# Patient Record
Sex: MALE | Race: WHITE
[De-identification: names, ages, dates, MRNs, and addresses within clinical notes are randomized per-mention and may not be internally consistent; named-entity substitution may affect disease eponyms.]

---

## 2018-03-05 ENCOUNTER — HOSPITAL ENCOUNTER (OUTPATIENT)
Dept: HOSPITAL 76 - LAB.WCP | Age: 68
Discharge: HOME | End: 2018-03-05
Attending: FAMILY MEDICINE
Payer: MEDICARE

## 2018-03-05 DIAGNOSIS — F32.9: ICD-10-CM

## 2018-03-05 DIAGNOSIS — R03.0: Primary | ICD-10-CM

## 2018-03-05 DIAGNOSIS — E78.5: ICD-10-CM

## 2018-03-05 DIAGNOSIS — R73.9: ICD-10-CM

## 2018-03-05 DIAGNOSIS — E03.9: ICD-10-CM

## 2018-03-05 DIAGNOSIS — Z12.5: ICD-10-CM

## 2018-03-05 LAB
ALBUMIN DIAFP-MCNC: 4.1 G/DL (ref 3.2–5.5)
ALBUMIN/GLOB SERPL: 1.5 {RATIO} (ref 1–2.2)
ALP SERPL-CCNC: 61 IU/L (ref 42–121)
ALT SERPL W P-5'-P-CCNC: 15 IU/L (ref 10–60)
ANION GAP SERPL CALCULATED.4IONS-SCNC: 5 MMOL/L (ref 6–13)
AST SERPL W P-5'-P-CCNC: 21 IU/L (ref 10–42)
BASOPHILS NFR BLD AUTO: 0 10^3/UL (ref 0–0.1)
BASOPHILS NFR BLD AUTO: 0.4 %
BILIRUB BLD-MCNC: 1 MG/DL (ref 0.2–1)
BUN SERPL-MCNC: 14 MG/DL (ref 6–20)
CALCIUM UR-MCNC: 8.6 MG/DL (ref 8.5–10.3)
CHLORIDE SERPL-SCNC: 104 MMOL/L (ref 101–111)
CHOLEST SERPL-MCNC: 202 MG/DL
CO2 SERPL-SCNC: 28 MMOL/L (ref 21–32)
CREAT SERPLBLD-SCNC: 1.1 MG/DL (ref 0.6–1.2)
EOSINOPHIL # BLD AUTO: 0.1 10^3/UL (ref 0–0.7)
EOSINOPHIL NFR BLD AUTO: 1.8 %
ERYTHROCYTE [DISTWIDTH] IN BLOOD BY AUTOMATED COUNT: 14.1 % (ref 12–15)
GFRSERPLBLD MDRD-ARVRAT: 67 ML/MIN/{1.73_M2} (ref 89–?)
GLOBULIN SER-MCNC: 2.8 G/DL (ref 2.1–4.2)
GLUCOSE SERPL-MCNC: 103 MG/DL (ref 70–100)
HDLC SERPL-MCNC: 56 MG/DL
HDLC SERPL: 3.6 {RATIO} (ref ?–5)
HGB UR QL STRIP: 15.1 G/DL (ref 14–18)
LDLC SERPL CALC-MCNC: 134 MG/DL
LDLC/HDLC SERPL: 2.4 {RATIO} (ref ?–3.6)
LYMPHOCYTES # SPEC AUTO: 2.3 10^3/UL (ref 1.5–3.5)
LYMPHOCYTES NFR BLD AUTO: 39 %
MCH RBC QN AUTO: 29.1 PG (ref 27–31)
MCHC RBC AUTO-ENTMCNC: 33.4 G/DL (ref 32–36)
MCV RBC AUTO: 87.4 FL (ref 80–94)
MONOCYTES # BLD AUTO: 0.7 10^3/UL (ref 0–1)
MONOCYTES NFR BLD AUTO: 12.6 %
NEUTROPHILS # BLD AUTO: 2.7 10^3/UL (ref 1.5–6.6)
NEUTROPHILS # SNV AUTO: 6 X10^3/UL (ref 4.8–10.8)
NEUTROPHILS NFR BLD AUTO: 46.2 %
PDW BLD AUTO: 8.1 FL (ref 7.4–11.4)
PLAT MORPH BLD: (no result)
PLATELET # BLD: 247 10^3/UL (ref 130–450)
PLATELET BLD QL SMEAR: (no result)
PROT SPEC-MCNC: 6.9 G/DL (ref 6.7–8.2)
RBC MAR: 5.2 10^6/UL (ref 4.7–6.1)
RBC MORPH BLD: (no result)
SODIUM SERPLBLD-SCNC: 137 MMOL/L (ref 135–145)
VLDLC SERPL-SCNC: 12 MG/DL

## 2018-03-05 PROCEDURE — 83721 ASSAY OF BLOOD LIPOPROTEIN: CPT

## 2018-03-05 PROCEDURE — 80061 LIPID PANEL: CPT

## 2018-03-05 PROCEDURE — 36415 COLL VENOUS BLD VENIPUNCTURE: CPT

## 2018-03-05 PROCEDURE — 80053 COMPREHEN METABOLIC PANEL: CPT

## 2018-03-05 PROCEDURE — 84153 ASSAY OF PSA TOTAL: CPT

## 2018-03-05 PROCEDURE — 85025 COMPLETE CBC W/AUTO DIFF WBC: CPT

## 2018-07-08 ENCOUNTER — HOSPITAL ENCOUNTER (EMERGENCY)
Dept: HOSPITAL 76 - ED | Age: 68
Discharge: HOME | End: 2018-07-08
Payer: MEDICARE

## 2018-07-08 VITALS — DIASTOLIC BLOOD PRESSURE: 81 MMHG | SYSTOLIC BLOOD PRESSURE: 133 MMHG

## 2018-07-08 DIAGNOSIS — M62.830: Primary | ICD-10-CM

## 2018-07-08 LAB
ALBUMIN DIAFP-MCNC: 4.4 G/DL (ref 3.2–5.5)
ALBUMIN/GLOB SERPL: 1.5 {RATIO} (ref 1–2.2)
ALP SERPL-CCNC: 63 IU/L (ref 42–121)
ALT SERPL W P-5'-P-CCNC: 19 IU/L (ref 10–60)
ANION GAP SERPL CALCULATED.4IONS-SCNC: 9 MMOL/L (ref 6–13)
AST SERPL W P-5'-P-CCNC: 34 IU/L (ref 10–42)
BASOPHILS NFR BLD AUTO: 0 10^3/UL (ref 0–0.1)
BASOPHILS NFR BLD AUTO: 0.3 %
BILIRUB BLD-MCNC: 1 MG/DL (ref 0.2–1)
BUN SERPL-MCNC: 13 MG/DL (ref 6–20)
CALCIUM UR-MCNC: 9 MG/DL (ref 8.5–10.3)
CHLORIDE SERPL-SCNC: 105 MMOL/L (ref 101–111)
CLARITY UR REFRACT.AUTO: CLEAR
CO2 SERPL-SCNC: 24 MMOL/L (ref 21–32)
CREAT SERPLBLD-SCNC: 1.2 MG/DL (ref 0.6–1.2)
EOSINOPHIL # BLD AUTO: 0 10^3/UL (ref 0–0.7)
EOSINOPHIL NFR BLD AUTO: 0 %
ERYTHROCYTE [DISTWIDTH] IN BLOOD BY AUTOMATED COUNT: 14 % (ref 12–15)
GFRSERPLBLD MDRD-ARVRAT: 60 ML/MIN/{1.73_M2} (ref 89–?)
GLOBULIN SER-MCNC: 2.9 G/DL (ref 2.1–4.2)
GLUCOSE SERPL-MCNC: 130 MG/DL (ref 70–100)
GLUCOSE UR QL STRIP.AUTO: NEGATIVE MG/DL
HGB UR QL STRIP: 14.6 G/DL (ref 14–18)
KETONES UR QL STRIP.AUTO: NEGATIVE MG/DL
LIPASE SERPL-CCNC: 21 U/L (ref 22–51)
LYMPHOCYTES # SPEC AUTO: 0.6 10^3/UL (ref 1.5–3.5)
LYMPHOCYTES NFR BLD AUTO: 8.6 %
MCH RBC QN AUTO: 29.4 PG (ref 27–31)
MCHC RBC AUTO-ENTMCNC: 32.6 G/DL (ref 32–36)
MCV RBC AUTO: 90.2 FL (ref 80–94)
MONOCYTES # BLD AUTO: 0.2 10^3/UL (ref 0–1)
MONOCYTES NFR BLD AUTO: 2.5 %
NEUTROPHILS # BLD AUTO: 6.2 10^3/UL (ref 1.5–6.6)
NEUTROPHILS # SNV AUTO: 7 X10^3/UL (ref 4.8–10.8)
NEUTROPHILS NFR BLD AUTO: 88.6 %
NITRITE UR QL STRIP.AUTO: NEGATIVE
PDW BLD AUTO: 8.3 FL (ref 7.4–11.4)
PH UR STRIP.AUTO: 6 PH (ref 5–7.5)
PLATELET # BLD: 261 10^3/UL (ref 130–450)
PROT SPEC-MCNC: 7.3 G/DL (ref 6.7–8.2)
PROT UR STRIP.AUTO-MCNC: NEGATIVE MG/DL
RBC # UR STRIP.AUTO: NEGATIVE /UL
RBC MAR: 4.96 10^6/UL (ref 4.7–6.1)
SODIUM SERPLBLD-SCNC: 138 MMOL/L (ref 135–145)
SP GR UR STRIP.AUTO: <=1.005 (ref 1–1.03)
UROBILINOGEN UR QL STRIP.AUTO: (no result) E.U./DL
UROBILINOGEN UR STRIP.AUTO-MCNC: NEGATIVE MG/DL

## 2018-07-08 PROCEDURE — 80053 COMPREHEN METABOLIC PANEL: CPT

## 2018-07-08 PROCEDURE — 81003 URINALYSIS AUTO W/O SCOPE: CPT

## 2018-07-08 PROCEDURE — 99283 EMERGENCY DEPT VISIT LOW MDM: CPT

## 2018-07-08 PROCEDURE — 83690 ASSAY OF LIPASE: CPT

## 2018-07-08 PROCEDURE — 36415 COLL VENOUS BLD VENIPUNCTURE: CPT

## 2018-07-08 PROCEDURE — 87086 URINE CULTURE/COLONY COUNT: CPT

## 2018-07-08 PROCEDURE — 96372 THER/PROPH/DIAG INJ SC/IM: CPT

## 2018-07-08 PROCEDURE — 85025 COMPLETE CBC W/AUTO DIFF WBC: CPT

## 2018-07-08 PROCEDURE — 81001 URINALYSIS AUTO W/SCOPE: CPT
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
## 2019-04-25 ENCOUNTER — HOSPITAL ENCOUNTER (OUTPATIENT)
Dept: HOSPITAL 76 - LAB.WCP | Age: 69
Discharge: HOME | End: 2019-04-25
Attending: FAMILY MEDICINE
Payer: MEDICARE

## 2019-04-25 DIAGNOSIS — E03.9: ICD-10-CM

## 2019-04-25 DIAGNOSIS — Z12.5: ICD-10-CM

## 2019-04-25 DIAGNOSIS — E78.5: Primary | ICD-10-CM

## 2019-04-25 DIAGNOSIS — Z79.899: ICD-10-CM

## 2019-04-25 LAB
ALBUMIN DIAFP-MCNC: 4 G/DL (ref 3.2–5.5)
ALBUMIN/GLOB SERPL: 1.3 {RATIO} (ref 1–2.2)
ALP SERPL-CCNC: 82 IU/L (ref 42–121)
ALT SERPL W P-5'-P-CCNC: 15 IU/L (ref 10–60)
ANION GAP SERPL CALCULATED.4IONS-SCNC: 7 MMOL/L (ref 6–13)
AST SERPL W P-5'-P-CCNC: 22 IU/L (ref 10–42)
BASOPHILS NFR BLD AUTO: 0 10^3/UL (ref 0–0.1)
BASOPHILS NFR BLD AUTO: 0.2 %
BILIRUB BLD-MCNC: 0.6 MG/DL (ref 0.2–1)
BUN SERPL-MCNC: 13 MG/DL (ref 6–20)
CALCIUM UR-MCNC: 8.9 MG/DL (ref 8.5–10.3)
CHLORIDE SERPL-SCNC: 101 MMOL/L (ref 101–111)
CHOLEST SERPL-MCNC: 206 MG/DL
CO2 SERPL-SCNC: 28 MMOL/L (ref 21–32)
CREAT SERPLBLD-SCNC: 1.1 MG/DL (ref 0.6–1.2)
EOSINOPHIL # BLD AUTO: 0.4 10^3/UL (ref 0–0.7)
EOSINOPHIL NFR BLD AUTO: 6.6 %
ERYTHROCYTE [DISTWIDTH] IN BLOOD BY AUTOMATED COUNT: 14.5 % (ref 12–15)
GFRSERPLBLD MDRD-ARVRAT: 67 ML/MIN/{1.73_M2} (ref 89–?)
GLOBULIN SER-MCNC: 3 G/DL (ref 2.1–4.2)
GLUCOSE SERPL-MCNC: 95 MG/DL (ref 70–100)
HDLC SERPL-MCNC: 49 MG/DL
HDLC SERPL: 4.2 {RATIO} (ref ?–5)
HGB UR QL STRIP: 14.9 G/DL (ref 14–18)
LDLC SERPL CALC-MCNC: 138 MG/DL
LDLC/HDLC SERPL: 2.8 {RATIO} (ref ?–3.6)
LYMPHOCYTES # SPEC AUTO: 2.3 10^3/UL (ref 1.5–3.5)
LYMPHOCYTES NFR BLD AUTO: 38.7 %
MCH RBC QN AUTO: 29.4 PG (ref 27–31)
MCHC RBC AUTO-ENTMCNC: 33.1 G/DL (ref 32–36)
MCV RBC AUTO: 88.7 FL (ref 80–94)
MONOCYTES # BLD AUTO: 1 10^3/UL (ref 0–1)
MONOCYTES NFR BLD AUTO: 17.2 %
NEUTROPHILS # BLD AUTO: 2.2 10^3/UL (ref 1.5–6.6)
NEUTROPHILS # SNV AUTO: 6 X10^3/UL (ref 4.8–10.8)
NEUTROPHILS NFR BLD AUTO: 37.3 %
PDW BLD AUTO: 8.8 FL (ref 7.4–11.4)
PLATELET # BLD: 221 10^3/UL (ref 130–450)
PROT SPEC-MCNC: 7 G/DL (ref 6.7–8.2)
PSA SCREEN (Z12.5): 0.33 NG/ML (ref 0–2)
RBC MAR: 5.07 10^6/UL (ref 4.7–6.1)
SODIUM SERPLBLD-SCNC: 136 MMOL/L (ref 135–145)
T3FREE SERPL-MCNC: 3.44 PG/ML (ref 2.5–3.9)
T4 FREE SERPL-MCNC: 1.37 NG/DL (ref 0.58–1.64)
VLDLC SERPL-SCNC: 19 MG/DL

## 2019-04-25 PROCEDURE — 84439 ASSAY OF FREE THYROXINE: CPT

## 2019-04-25 PROCEDURE — 84443 ASSAY THYROID STIM HORMONE: CPT

## 2019-04-25 PROCEDURE — 36415 COLL VENOUS BLD VENIPUNCTURE: CPT

## 2019-04-25 PROCEDURE — 84153 ASSAY OF PSA TOTAL: CPT

## 2019-04-25 PROCEDURE — 80053 COMPREHEN METABOLIC PANEL: CPT

## 2019-04-25 PROCEDURE — 80061 LIPID PANEL: CPT

## 2019-04-25 PROCEDURE — 84481 FREE ASSAY (FT-3): CPT

## 2019-04-25 PROCEDURE — 83721 ASSAY OF BLOOD LIPOPROTEIN: CPT

## 2019-04-25 PROCEDURE — 85025 COMPLETE CBC W/AUTO DIFF WBC: CPT

## 2019-06-04 ENCOUNTER — HOSPITAL ENCOUNTER (OUTPATIENT)
Dept: HOSPITAL 76 - SDS | Age: 69
Discharge: HOME | End: 2019-06-04
Attending: SURGERY
Payer: MEDICARE

## 2019-06-04 VITALS — DIASTOLIC BLOOD PRESSURE: 72 MMHG | SYSTOLIC BLOOD PRESSURE: 105 MMHG

## 2019-06-04 DIAGNOSIS — K21.9: ICD-10-CM

## 2019-06-04 DIAGNOSIS — Z12.11: Primary | ICD-10-CM

## 2019-06-04 PROCEDURE — 0DJD8ZZ INSPECTION OF LOWER INTESTINAL TRACT, VIA NATURAL OR ARTIFICIAL OPENING ENDOSCOPIC: ICD-10-PCS | Performed by: SURGERY

## 2020-02-24 ENCOUNTER — HOSPITAL ENCOUNTER (OUTPATIENT)
Dept: HOSPITAL 76 - DI | Age: 70
Discharge: HOME | End: 2020-02-24
Attending: FAMILY MEDICINE
Payer: MEDICARE

## 2020-02-24 DIAGNOSIS — R05: Primary | ICD-10-CM

## 2020-02-24 PROCEDURE — 71046 X-RAY EXAM CHEST 2 VIEWS: CPT

## 2020-02-25 NOTE — XRAY REPORT
Reason:  COUGH

Procedure Date:  02/24/2020   

Accession Number:  [id number] / [id number]                    

Procedure:  XR  - Chest 2 View X-Ray CPT Code:  02167

 

***Final Report***

 

 

FULL RESULT:

 

 

EXAM:

CHEST RADIOGRAPHY

 

EXAM DATE: 2/24/2020 11:37 AM.

 

CLINICAL HISTORY: Dry cough. Ill x4 weeks.

 

COMPARISON: 12/20/2015.

 

TECHNIQUE: 2 views.

 

FINDINGS:

Lungs/Pleura: Apical lordotic positioning. No focal opacities or vascular 

congestion. No pleural effusion or pneumothorax. Normal volumes.

 

Mediastinum: Heart and mediastinal contours are unremarkable.

 

Bones: No acute osseous findings identified.

IMPRESSION:

1. No acute cardiopulmonary findings or significant change.

 

[facility]

## 2020-02-26 ENCOUNTER — HOSPITAL ENCOUNTER (EMERGENCY)
Dept: HOSPITAL 76 - ED | Age: 70
Discharge: HOME | End: 2020-02-26
Payer: MEDICARE

## 2020-02-26 VITALS — DIASTOLIC BLOOD PRESSURE: 72 MMHG | SYSTOLIC BLOOD PRESSURE: 128 MMHG

## 2020-02-26 DIAGNOSIS — R05: Primary | ICD-10-CM

## 2020-02-26 DIAGNOSIS — R91.8: ICD-10-CM

## 2020-02-26 PROCEDURE — 99284 EMERGENCY DEPT VISIT MOD MDM: CPT

## 2020-02-26 PROCEDURE — 99283 EMERGENCY DEPT VISIT LOW MDM: CPT

## 2020-02-26 PROCEDURE — 71046 X-RAY EXAM CHEST 2 VIEWS: CPT

## 2020-02-26 NOTE — XRAY REPORT
Reason:  Cough

Procedure Date:  02/26/2020   

Accession Number:  [id number] / [id number]                    

Procedure:  XR  - Chest 2 View X-Ray CPT Code:  38321

 

***Final Report***

 

 

FULL RESULT:

 

 

EXAM:

CHEST RADIOGRAPHY

 

EXAM DATE: 2/26/2020 08:23 AM.

 

CLINICAL HISTORY: Cough.

 

COMPARISON: CHEST 2 VIEW 02/24/2020 11:32 AM.

 

TECHNIQUE: 2 views.

 

FINDINGS:

Lungs/Pleura: Small area of increased density overlying the sixth 

anterior right rib could be a small infiltrate. No other focal opacities 

evident. No pleural effusion. No pneumothorax. Normal volumes.

 

Mediastinum: Heart and mediastinal contours are unremarkable.

 

Other: Degenerative change in the spine with lumbar dextroscoliosis..

IMPRESSION: Possible small area of infiltrate inferior right lung. No 

other potentially acute findings seen.

 

[facility]

## 2020-02-26 NOTE — ED PHYSICIAN DOCUMENTATION
PD HPI URI





- Stated complaint


Stated Complaint: COUGH





- Chief complaint


Chief Complaint: Resp





- History obtained from


History obtained from: Patient





- History of Present Illness


Timing - onset: How many weeks ago (4)


Timing duration: Weeks (4)


Timing details: Gradual onset, Still present


Associated symptoms: Productive cough (now somewhat productive with clear/white 

sputum).  No: Fever


Contributing factors: No: Sick contact, Travel, COPD / asthma





Review of Systems


Constitutional: denies: Fever, Chills


Nose: denies: Rhinorrhea / runny nose, Congestion


Throat: denies: Sore throat


Cardiac: denies: Chest pain / pressure


Respiratory: reports: Dyspnea, Cough.  denies: Wheezing


GI: denies: Nausea, Vomiting, Diarrhea


Musculoskeletal: denies: Extremity swelling


Neurologic: denies: Generalized weakness





PD PAST MEDICAL HISTORY





- Past Medical History


Past Medical History: Yes


Cardiovascular: None


Respiratory: None


Endocrine/Autoimmune: HyPOthyroidism


GI: None


: None


HEENT: Other


Psych: Depression


Musculoskeletal: Osteoarthritis, Chronic back pain


Derm: None





- Past Surgical History


Past Surgical History: Yes


General: Appendectomy


Ortho: ACL reconstruction





- Present Medications


Home Medications: 


                                Ambulatory Orders











 Medication  Instructions  Recorded  Confirmed


 


Levothyroxine Sodium [Synthroid] 175 mcg PO DAILY 10/08/13 06/04/19


 


Bupropion HCl [Wellbutrin Xl] 300 mg PO DAILY 12/20/15 06/04/19


 


Dutasteride [Avodart] 0.5 mg PO DAILY 12/20/15 06/04/19


 


Aspirin 81 mg PO DAILY 06/03/19 06/03/19


 


DULoxetine [Cymbalta] 20 mg PO DAILY 06/03/19 06/03/19


 


Doxycycline Monohydrate 100 mg PO BID #14 tablet 02/26/20 


 


dexAMETHasone [Decadron] 4 mg PO DAILY #5 tablet 02/26/20 


 


guaiFENesin/CODEINE [Robitussin AC] 10 ml PO Q6H PRN #240 ml 02/26/20 














- Allergies


Allergies/Adverse Reactions: 


                                    Allergies











Allergy/AdvReac Type Severity Reaction Status Date / Time


 


No Known Drug Allergies Allergy   Verified 02/26/20 08:16














- Social History


Does the pt smoke?: No


Smoking Status: Never smoker


Does the pt drink ETOH?: Yes


Does the pt have substance abuse?: No





- Immunizations


Immunizations are current?: No


Immunizations: TDAP >10years/unknown





- POLST


Patient has POLST: No





PD ED PE NORMAL





- Vitals


Vital signs reviewed: Yes





- General


General: Alert and oriented X 3, No acute distress, Well developed/nourished





- HEENT


HEENT: Pharynx benign





- Neck


Neck: Supple, no meningeal sign, No adenopathy





- Cardiac


Cardiac: RRR, No murmur





- Respiratory


Respiratory: Clear bilaterally





- Derm


Derm: Normal color, Warm and dry





- Extremities


Extremities: No edema, No calf tenderness / cord





- Neuro


Neuro: Alert and oriented X 3, No motor deficit, Normal speech





Results





- Vitals


Vitals: 


                               Vital Signs - 24 hr











  02/26/20





  09:36


 


Temperature 36.5 C


 


Heart Rate 68


 


Respiratory 15





Rate 


 


Blood Pressure 128/72


 


O2 Saturation 99








                                     Oxygen











O2 Source                      Room air

















- Rads (name of study)


  ** chest xray


Radiology: Prelim report reviewed (possible small infiltrate right lower), See 

rad report





PD MEDICAL DECISION MAKING





- ED course


Complexity details: considered differential, d/w patient





Departure





- Departure


Disposition: 01 Home, Self Care


Clinical Impression: 


 Persistent cough, Infiltrate of lung present on chest x-ray





Condition: Stable


Record reviewed to determine appropriate education?: Yes


Follow-Up: 


Ike Sanderson DO [Primary Care Provider] - 


Prescriptions: 


dexAMETHasone [Decadron] 4 mg PO DAILY #5 tablet


Doxycycline Monohydrate 100 mg PO BID #14 tablet


guaiFENesin/CODEINE [Robitussin AC] 10 ml PO Q6H PRN #240 ml


 PRN Reason: Cough


Comments: 


You can continue the benzonatate (Tessalon) 3-4 times a day as needed for cough.

 2 would add Robitussin with codeine to help with cough suppression.





To reduce the bronchial inflammation and irritation, use the Decadron steroid 

daily for 5 more days.





Your chest x-ray showed a small patch of infiltrate in the right lower area 

which could represent a mild pneumonia so add doxycycline antibiotic as well.





Recheck if not improving well over the next several days to week.


Discharge Date/Time: 02/26/20 09:47

## 2020-09-09 ENCOUNTER — HOSPITAL ENCOUNTER (OUTPATIENT)
Dept: HOSPITAL 76 - DI | Age: 70
Discharge: HOME | End: 2020-09-09
Attending: PHYSICIAN ASSISTANT
Payer: MEDICARE

## 2020-09-09 DIAGNOSIS — M25.551: ICD-10-CM

## 2020-09-09 DIAGNOSIS — M79.671: Primary | ICD-10-CM

## 2020-09-10 NOTE — XRAY REPORT
PROCEDURE:  Foot 3 View RT

 

INDICATIONS:  RIGHT FOOT PAIN

 

TECHNIQUE:  3 views of the foot were acquired.  

 

COMPARISON:  None.

 

FINDINGS:  

 

Bones:  No fractures or dislocations.  No suspicious bony lesions.  

 

Soft tissues:  No tibiotalar joint effusion.  Achilles tendon appears normal.  

 

IMPRESSION:  

 

1. No fracture or dislocation.

 

Reviewed by: Amaury Dutton MD on 9/10/2020 12:39 PM PDT

Approved by: Amaury Dutton MD on 9/10/2020 12:39 PM PDT

 

 

Station ID:  [id number]

## 2020-09-10 NOTE — XRAY REPORT
PROCEDURE:  Hip w/Pelvis 1V RT

 

INDICATIONS:  Right hip pain

 

TECHNIQUE:  AP pelvis with lateral view of the right hip.

 

COMPARISON:  CT abdomen pelvis 6/12/2014.

 

FINDINGS:  

 

Bones:  No fractures or dislocations.  The hip joint spaces appear preserved. Pelvic ring appears int
act.  No suspicious bony lesions.  There is facet arthropathy and degenerative disc disease within th
e visualized lower lumbar spine.

 

Soft tissues:  The visualized bowel gas pattern is normal.  No suspicious soft tissue calcifications.
  

 

IMPRESSION:  

 

1. No acute bony abnormality.

 

Reviewed by: Amaury Dutton MD on 9/10/2020 12:25 PM PDT

Approved by: Amaury Dutton MD on 9/10/2020 12:25 PM PDT

 

 

Station ID:  [id number]

## 2020-09-30 ENCOUNTER — HOSPITAL ENCOUNTER (OUTPATIENT)
Dept: HOSPITAL 76 - LAB | Age: 70
Discharge: HOME | End: 2020-09-30
Attending: UROLOGY
Payer: MEDICARE

## 2020-09-30 DIAGNOSIS — Z12.5: Primary | ICD-10-CM

## 2020-09-30 PROCEDURE — 36415 COLL VENOUS BLD VENIPUNCTURE: CPT

## 2020-09-30 PROCEDURE — 84153 ASSAY OF PSA TOTAL: CPT

## 2020-11-30 ENCOUNTER — HOSPITAL ENCOUNTER (OUTPATIENT)
Dept: HOSPITAL 76 - LAB.R | Age: 70
Discharge: HOME | End: 2020-11-30
Attending: FAMILY MEDICINE
Payer: MEDICARE

## 2020-11-30 DIAGNOSIS — R05: Primary | ICD-10-CM

## 2020-11-30 DIAGNOSIS — Z20.828: ICD-10-CM

## 2021-02-08 ENCOUNTER — HOSPITAL ENCOUNTER (OUTPATIENT)
Dept: HOSPITAL 76 - DI.N | Age: 71
Discharge: HOME | End: 2021-02-08
Attending: PHYSICIAN ASSISTANT
Payer: MEDICARE

## 2021-02-08 DIAGNOSIS — M19.041: ICD-10-CM

## 2021-02-08 DIAGNOSIS — M79.641: Primary | ICD-10-CM

## 2021-02-08 NOTE — XRAY REPORT
PROCEDURE:  Hand 3 View RT

 

INDICATIONS:  R HAND PX

 

TECHNIQUE:  3 views of the hand(s) acquired.  

 

COMPARISON:  None

 

FINDINGS:  

 

Bones:  No fractures or dislocations.  No suspicious bony lesions.  Mild scattered IP degenerative na
rrowing with moderate narrowing at the first CMC joint. There is mild cluster of calcifications noted
 along the lateral aspect of the first CMC joint.

 

Soft tissues:  No suspicious soft tissue calcifications.  

 

IMPRESSION:  

Degenerative changes most notable at the first CMC joint as above with adjacent calcifications. The l
atter could be related to old trauma.

 

Reviewed by: Elisa Garza MD on 2/8/2021 2:52 PM PST

Approved by: Elisa Garza MD on 2/8/2021 2:52 PM PST

 

 

Station ID:  [id number]

## 2021-06-09 ENCOUNTER — HOSPITAL ENCOUNTER (OUTPATIENT)
Dept: HOSPITAL 76 - LAB.WCP | Age: 71
Discharge: HOME | End: 2021-06-09
Attending: FAMILY MEDICINE
Payer: MEDICARE

## 2021-06-09 DIAGNOSIS — E78.5: Primary | ICD-10-CM

## 2021-06-09 DIAGNOSIS — N40.1: ICD-10-CM

## 2021-06-09 DIAGNOSIS — K21.9: ICD-10-CM

## 2021-06-09 DIAGNOSIS — E03.9: ICD-10-CM

## 2021-06-09 DIAGNOSIS — N13.8: ICD-10-CM

## 2021-06-09 LAB
ALBUMIN DIAFP-MCNC: 4.4 G/DL (ref 3.2–5.5)
ALBUMIN/GLOB SERPL: 1.8 {RATIO} (ref 1–2.2)
ALP SERPL-CCNC: 77 IU/L (ref 42–121)
ALT SERPL W P-5'-P-CCNC: 16 IU/L (ref 10–60)
ANION GAP SERPL CALCULATED.4IONS-SCNC: 10 MMOL/L (ref 6–13)
AST SERPL W P-5'-P-CCNC: 23 IU/L (ref 10–42)
BASOPHILS NFR BLD AUTO: 0 10^3/UL (ref 0–0.1)
BASOPHILS NFR BLD AUTO: 0.7 %
BILIRUB BLD-MCNC: 0.6 MG/DL (ref 0.2–1)
BUN SERPL-MCNC: 22 MG/DL (ref 6–20)
CALCIUM UR-MCNC: 9.4 MG/DL (ref 8.5–10.3)
CHLORIDE SERPL-SCNC: 103 MMOL/L (ref 101–111)
CHOLEST SERPL-MCNC: 223 MG/DL
CO2 SERPL-SCNC: 28 MMOL/L (ref 21–32)
CREAT SERPLBLD-SCNC: 1.2 MG/DL (ref 0.6–1.2)
EOSINOPHIL # BLD AUTO: 0.2 10^3/UL (ref 0–0.7)
EOSINOPHIL NFR BLD AUTO: 3.4 %
ERYTHROCYTE [DISTWIDTH] IN BLOOD BY AUTOMATED COUNT: 13.7 % (ref 12–15)
GFRSERPLBLD MDRD-ARVRAT: 60 ML/MIN/{1.73_M2} (ref 89–?)
GLOBULIN SER-MCNC: 2.5 G/DL (ref 2.1–4.2)
GLUCOSE SERPL-MCNC: 97 MG/DL (ref 70–100)
HCT VFR BLD AUTO: 47.2 % (ref 42–52)
HDLC SERPL-MCNC: 60 MG/DL
HDLC SERPL: 3.7 {RATIO} (ref ?–5)
HGB UR QL STRIP: 14.8 G/DL (ref 14–18)
LDLC SERPL CALC-MCNC: 147 MG/DL
LDLC/HDLC SERPL: 2.5 {RATIO} (ref ?–3.6)
LYMPHOCYTES # SPEC AUTO: 1.9 10^3/UL (ref 1.5–3.5)
LYMPHOCYTES NFR BLD AUTO: 36.1 %
MCH RBC QN AUTO: 28.8 PG (ref 27–31)
MCHC RBC AUTO-ENTMCNC: 31.4 G/DL (ref 32–36)
MCV RBC AUTO: 92 FL (ref 80–94)
MONOCYTES # BLD AUTO: 0.8 10^3/UL (ref 0–1)
MONOCYTES NFR BLD AUTO: 14 %
NEUTROPHILS # BLD AUTO: 2.4 10^3/UL (ref 1.5–6.6)
NEUTROPHILS # SNV AUTO: 5.3 X10^3/UL (ref 4.8–10.8)
NEUTROPHILS NFR BLD AUTO: 45.6 %
NRBC # BLD AUTO: 0 /100WBC
NRBC # BLD AUTO: 0 X10^3/UL
PDW BLD AUTO: 10.5 FL (ref 7.4–11.4)
PLATELET # BLD: 255 10^3/UL (ref 130–450)
POTASSIUM SERPL-SCNC: 4.6 MMOL/L (ref 3.5–5)
PROT SPEC-MCNC: 6.9 G/DL (ref 6.7–8.2)
RBC MAR: 5.13 10^6/UL (ref 4.7–6.1)
SODIUM SERPLBLD-SCNC: 141 MMOL/L (ref 135–145)
T4 FREE SERPL-MCNC: 1.38 NG/DL (ref 0.58–1.64)
TRIGL P FAST SERPL-MCNC: 80 MG/DL
TSH SERPL-ACNC: < 0.08 UIU/ML (ref 0.34–5.6)
VLDLC SERPL-SCNC: 16 MG/DL

## 2021-06-09 PROCEDURE — 84443 ASSAY THYROID STIM HORMONE: CPT

## 2021-06-09 PROCEDURE — 36415 COLL VENOUS BLD VENIPUNCTURE: CPT

## 2021-06-09 PROCEDURE — 80053 COMPREHEN METABOLIC PANEL: CPT

## 2021-06-09 PROCEDURE — 83721 ASSAY OF BLOOD LIPOPROTEIN: CPT

## 2021-06-09 PROCEDURE — 84153 ASSAY OF PSA TOTAL: CPT

## 2021-06-09 PROCEDURE — 85025 COMPLETE CBC W/AUTO DIFF WBC: CPT

## 2021-06-09 PROCEDURE — 80061 LIPID PANEL: CPT

## 2021-06-09 PROCEDURE — 84439 ASSAY OF FREE THYROXINE: CPT

## 2021-07-01 ENCOUNTER — HOSPITAL ENCOUNTER (OUTPATIENT)
Dept: HOSPITAL 76 - COV | Age: 71
Discharge: HOME | End: 2021-07-01
Attending: FAMILY MEDICINE
Payer: MEDICARE

## 2021-07-01 DIAGNOSIS — M79.10: Primary | ICD-10-CM

## 2021-07-01 DIAGNOSIS — R53.83: ICD-10-CM

## 2021-07-01 DIAGNOSIS — R07.0: ICD-10-CM

## 2021-07-01 DIAGNOSIS — Z20.822: ICD-10-CM

## 2021-07-13 ENCOUNTER — HOSPITAL ENCOUNTER (EMERGENCY)
Dept: HOSPITAL 76 - ED | Age: 71
Discharge: HOME | End: 2021-07-13
Payer: MEDICARE

## 2021-07-13 VITALS — SYSTOLIC BLOOD PRESSURE: 135 MMHG | DIASTOLIC BLOOD PRESSURE: 78 MMHG
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
## 2021-07-13 DIAGNOSIS — M17.12: ICD-10-CM

## 2021-07-13 DIAGNOSIS — S86.812A: Primary | ICD-10-CM

## 2021-07-13 DIAGNOSIS — Z79.82: ICD-10-CM

## 2021-07-13 DIAGNOSIS — M19.072: ICD-10-CM

## 2021-07-13 PROCEDURE — 99283 EMERGENCY DEPT VISIT LOW MDM: CPT

## 2021-07-13 PROCEDURE — 73590 X-RAY EXAM OF LOWER LEG: CPT

## 2021-07-13 RX ADMIN — HYDROCODONE BITARTRATE AND ACETAMINOPHEN STA TAB: 5; 325 TABLET ORAL at 10:51

## 2021-07-13 RX ADMIN — IBUPROFEN STA MG: 600 TABLET, FILM COATED ORAL at 10:51

## 2021-07-13 NOTE — ED PHYSICIAN DOCUMENTATION
PD HPI LOWER EXT INJURY





- Stated complaint


Stated Complaint: LT LEG INJ





- Chief complaint


Chief Complaint: Ext Problem





- History obtained from


History obtained from: Patient





- History of Present Illness


PD HPI LOW EXT INJURY LOCATION: Left, Calf


Type of injury: Twist (he fell and felt abrupt pain lateral mid calf muscles. 

Pain with walking. No foot drop.)


Worsened by: Moving, Palpating, Other (walking)


Associated symptoms: No: Weakness, Numbness, Tingling


Contributing factors: No: Anticoagulated, Prior ortho surgery


Similar symptoms before: Follow up


Recently seen: Not recently seen





Review of Systems


Skin: denies: Abrasion (s), Laceration (s)


Neurologic: denies: Focal weakness, Numbness, Altered mental status, Headache, 

Head injury, LOC





PD PAST MEDICAL HISTORY





- Past Medical History


Past Medical History: Yes


Cardiovascular: None


Respiratory: None


Neuro: None


Endocrine/Autoimmune: HyPOthyroidism


GI: None


: None


HEENT: Other


Psych: Depression


Musculoskeletal: Osteoarthritis, Chronic back pain


Derm: None





- Past Surgical History


Past Surgical History: Yes


General: Appendectomy


Ortho: ACL reconstruction





- Present Medications


Home Medications: 


                                Ambulatory Orders











 Medication  Instructions  Recorded  Confirmed


 


Levothyroxine Sodium [Synthroid] 175 mcg PO DAILY 10/08/13 07/13/21


 


Bupropion HCl [Wellbutrin Xl] 300 mg PO DAILY 12/20/15 07/13/21


 


Aspirin 81 mg PO DAILY 06/03/19 07/13/21


 


DULoxetine [Cymbalta] 20 mg PO DAILY 06/03/19 07/13/21


 


HYDROcod/ACETAM 5/325 [Norco 5/325] 1 ea PO Q6H PRN #10 tablet 07/13/21 














- Allergies


Allergies/Adverse Reactions: 


                                    Allergies











Allergy/AdvReac Type Severity Reaction Status Date / Time


 


No Known Drug Allergies Allergy   Verified 07/13/21 08:47














- Social History


Does the pt smoke?: No


Smoking Status: Never smoker


Does the pt drink ETOH?: Yes


Does the pt have substance abuse?: No





- Immunizations


Immunizations are current?: No


Immunizations: TDAP >10years/unknown





- POLST


Patient has POLST: No





PD ED PE NORMAL





- Vitals


Vital signs reviewed: Yes





- General


General: Alert and oriented X 3, No acute distress (limping gait into ER room. 

), Well developed/nourished





- HEENT


HEENT: Atraumatic





- Neck


Neck: Supple, no meningeal sign, No bony TTP





- Derm


Derm: Normal color, Warm and dry





- Extremities


Extremities: Other (left lateral mid calf with some swelling and tenderness. Not

tendeer nor any deformity at lower third Tendon area. )





- Neuro


Neuro: Alert and oriented X 3, No motor deficit, No sensory deficit





Results





- Vitals


Vitals: 


                                     Oxygen











O2 Source                      Room air

















- Rads (name of study)


  ** lower leg xray


Radiology: Prelim report reviewed (no fractures), See rad report





PD MEDICAL DECISION MAKING





- ED course


Complexity details: reviewed results, considered differential, d/w patient





Departure





- Departure


Disposition: 01 Home, Self Care


Clinical Impression: 


Fall from slip, trip, or stumble


Qualifiers:


 Encounter type: initial encounter Qualified Code(s): W01.0XXA - Fall on same 

level from slipping, tripping and stumbling without subsequent striking against 

object, initial encounter





Strain of calf muscle


Qualifiers:


 Encounter type: initial encounter Laterality: left Qualified Code(s): S86.812A 

- Strain of other muscle(s) and tendon(s) at lower leg level, left leg, initial 

encounter





Condition: Stable


Record reviewed to determine appropriate education?: Yes


Instructions:  ED Strain Muscle Ext


Follow-Up: 


Ike Sanderson DO [Primary Care Provider] - 


Arnold Plaza MD [Provider Admit Priv/Credential] - 


Prescriptions: 


HYDROcod/ACETAM 5/325 [Norco 5/325] 1 ea PO Q6H PRN #10 tablet


 PRN Reason: Pain


Comments: 


Your x-ray is normal without any sign of bony injury.  Your history and exam are

consistent with a partial tear of the calf muscle.  This would be treated with 

conservative measures of partial to no weightbearing as needed for comfort, Ace 

wrap for the area.  Time to heal as often 3 to 4 weeks with progressive use of 

the calf muscles (i.e. walking and increased activity) as tolerated.  No 

prolonged walking or more stressful use of the muscle such as jogging running or

ladders etc. for 2 to 3 weeks anyway.





Anti-inflammatory such as ibuprofen or naproxen 2-3 times a day.  Add Tylenol or

hydrocodone as needed for pain in the short-term.





Crutches as needed for partial to no weightbearing and progress as able.  

Follow-up with your primary care or orthopedics in the next week or 2 if not 

having considerable improvement though again it can take a good month or more to

fully heal back to normal


Discharge Date/Time: 07/13/21 11:29

## 2021-07-13 NOTE — XRAY REPORT
PROCEDURE:  Tib/Fib LT

 

INDICATIONS:  left calf pain with fall yesterday

 

TECHNIQUE:  2 views of the tibia and fibula were acquired.  

 

COMPARISON:  None.

 

FINDINGS:  

 

Bones: Prior left ACL repair is noted with post surgical changes. No gross hardware loosening or fail
ure. Mild to moderate tricompartmental osteoarthritis in left knee is seen. Mild osteoarthritic jean-baptiste
es in tibiotalar joint is also noted. No fractures or dislocations.  No suspicious bony lesions.  

 

Soft tissues:  No suspicious soft tissue calcifications or masses.  

 

IMPRESSION:  

No acute left lower leg fracture or dislocation. Left knee and ankle joint osteoarthritis as above.

 

Reviewed by: Peter Arriaza MD on 7/13/2021 11:06 AM PDT

Approved by: Peter Arriaza MD on 7/13/2021 11:06 AM PDT

 

 

Station ID:  [id number]

## 2021-12-05 ENCOUNTER — HOSPITAL ENCOUNTER (EMERGENCY)
Dept: HOSPITAL 76 - ED | Age: 71
Discharge: HOME | End: 2021-12-05
Payer: MEDICARE

## 2021-12-05 VITALS — SYSTOLIC BLOOD PRESSURE: 146 MMHG | DIASTOLIC BLOOD PRESSURE: 92 MMHG

## 2021-12-05 DIAGNOSIS — R27.0: ICD-10-CM

## 2021-12-05 DIAGNOSIS — R05.9: ICD-10-CM

## 2021-12-05 DIAGNOSIS — R20.2: Primary | ICD-10-CM

## 2021-12-05 DIAGNOSIS — R09.81: ICD-10-CM

## 2021-12-05 LAB
ALBUMIN DIAFP-MCNC: 4 G/DL (ref 3.2–5.5)
ALBUMIN/GLOB SERPL: 1.3 {RATIO} (ref 1–2.2)
ALP SERPL-CCNC: 69 IU/L (ref 42–121)
ALT SERPL W P-5'-P-CCNC: 15 IU/L (ref 10–60)
ANION GAP SERPL CALCULATED.4IONS-SCNC: 10 MMOL/L (ref 6–13)
AST SERPL W P-5'-P-CCNC: 19 IU/L (ref 10–42)
BASOPHILS NFR BLD AUTO: 0 10^3/UL (ref 0–0.1)
BASOPHILS NFR BLD AUTO: 0.5 %
BILIRUB BLD-MCNC: 0.7 MG/DL (ref 0.2–1)
BUN SERPL-MCNC: 12 MG/DL (ref 6–20)
CALCIUM UR-MCNC: 9 MG/DL (ref 8.5–10.3)
CHLORIDE SERPL-SCNC: 100 MMOL/L (ref 101–111)
CO2 SERPL-SCNC: 29 MMOL/L (ref 21–32)
CREAT SERPLBLD-SCNC: 1 MG/DL (ref 0.6–1.2)
EOSINOPHIL # BLD AUTO: 0.3 10^3/UL (ref 0–0.7)
EOSINOPHIL NFR BLD AUTO: 4 %
ERYTHROCYTE [DISTWIDTH] IN BLOOD BY AUTOMATED COUNT: 13.8 % (ref 12–15)
GFRSERPLBLD MDRD-ARVRAT: 74 ML/MIN/{1.73_M2} (ref 89–?)
GLOBULIN SER-MCNC: 3.1 G/DL (ref 2.1–4.2)
GLUCOSE SERPL-MCNC: 100 MG/DL (ref 70–100)
HCT VFR BLD AUTO: 44.5 % (ref 42–52)
HGB UR QL STRIP: 14.9 G/DL (ref 14–18)
LIPASE SERPL-CCNC: 23 U/L (ref 22–51)
LYMPHOCYTES # SPEC AUTO: 2 10^3/UL (ref 1.5–3.5)
LYMPHOCYTES NFR BLD AUTO: 24.5 %
MCH RBC QN AUTO: 30.2 PG (ref 27–31)
MCHC RBC AUTO-ENTMCNC: 33.5 G/DL (ref 32–36)
MCV RBC AUTO: 90.3 FL (ref 80–94)
MONOCYTES # BLD AUTO: 1 10^3/UL (ref 0–1)
MONOCYTES NFR BLD AUTO: 11.8 %
NEUTROPHILS # BLD AUTO: 4.9 10^3/UL (ref 1.5–6.6)
NEUTROPHILS # SNV AUTO: 8.2 X10^3/UL (ref 4.8–10.8)
NEUTROPHILS NFR BLD AUTO: 59.1 %
NRBC # BLD AUTO: 0 /100WBC
NRBC # BLD AUTO: 0 X10^3/UL
PDW BLD AUTO: 9.2 FL (ref 7.4–11.4)
PLATELET # BLD: 287 10^3/UL (ref 130–450)
POTASSIUM SERPL-SCNC: 4 MMOL/L (ref 3.5–5)
PROT SPEC-MCNC: 7.1 G/DL (ref 6.7–8.2)
RBC MAR: 4.93 10^6/UL (ref 4.7–6.1)
SODIUM SERPLBLD-SCNC: 139 MMOL/L (ref 135–145)
TSH SERPL-ACNC: < 0.08 UIU/ML (ref 0.34–5.6)
VIT B12 SERPL-MCNC: 340 PG/ML (ref 180–914)

## 2021-12-05 PROCEDURE — 99284 EMERGENCY DEPT VISIT MOD MDM: CPT

## 2021-12-05 PROCEDURE — 84443 ASSAY THYROID STIM HORMONE: CPT

## 2021-12-05 PROCEDURE — 99282 EMERGENCY DEPT VISIT SF MDM: CPT

## 2021-12-05 PROCEDURE — 80053 COMPREHEN METABOLIC PANEL: CPT

## 2021-12-05 PROCEDURE — 71046 X-RAY EXAM CHEST 2 VIEWS: CPT

## 2021-12-05 PROCEDURE — 70498 CT ANGIOGRAPHY NECK: CPT

## 2021-12-05 PROCEDURE — 84436 ASSAY OF TOTAL THYROXINE: CPT

## 2021-12-05 PROCEDURE — 36415 COLL VENOUS BLD VENIPUNCTURE: CPT

## 2021-12-05 PROCEDURE — 82607 VITAMIN B-12: CPT

## 2021-12-05 PROCEDURE — 70496 CT ANGIOGRAPHY HEAD: CPT

## 2021-12-05 PROCEDURE — 85025 COMPLETE CBC W/AUTO DIFF WBC: CPT

## 2021-12-05 PROCEDURE — 83690 ASSAY OF LIPASE: CPT

## 2021-12-05 NOTE — CT REPORT
PROCEDURE:  ANGIO NECK W

 

INDICATIONS:  L sided facial droop, L neck pain

 

CONTRAST:  IV CONTRAST: Optiray 320 ml: 80 PO CONTRAST: *NO PO CONTRAST 

 

TECHNIQUE:  

After the administration of intravenous contrast, 1.5 mm axial sections acquired from the aortic arch
 to the [place].  Coronal 3-D maximum intensity projection (MIP) and/or volume rendering ref
ormats were then performed.  For radiation dose reduction, the following was used:  automated exposur
e control, adjustment of mA and/or kV according to patient size.

 

COMPARISON:  None.

 

FINDINGS:  

Image quality:  Excellent.  

 

Carotid system:  The great vessels demonstrate a conventional anatomy as they arise from the aortic a
rch.  The origins of the common carotid arteries appear patent.  The common carotid arteries demonstr
ate normal calibers and courses.  The bifurcation regions appear widely patent bilaterally.  The inte
rnal carotid arteries demonstrate normal caliber and course.  

 

Posterior circulation:  The origins of the vertebral arteries appear patent.  The more superior porti
ons of the vertebral arteries demonstrate normal course and also appear patent. There is a small shor
t segment fenestration within the distal right vertebral artery. There vertebral arteries join to for
m a patent basilar artery.  

 

Soft tissues:  Visualized neck soft tissues demonstrate no suspicious abnormalities.  The thyroid is 
normal in size and there are no incidental findings.

 

Bones:  No suspicious bony lesions.  Visualized cervical spine demonstrates straightening of the cerv
ical lordosis. There is moderate multilevel degenerative disc disease and facet arthropathy throughou
t the cervical spine.

 

IMPRESSION:  

 

1. No high-grade stenosis or occlusion of the head and neck arteries.

 

2. 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

 

The estimate of stenosis included in the report of the imaging study was calculated using the NASCET 
method

 

Reviewed by: Amaury Dutton MD on 12/5/2021 1:27 PM [facility]

Approved by: Amaury Dutton MD on 12/5/2021 1:27 PM [facility]

 

 

Station ID:  [id number]

## 2021-12-05 NOTE — CT REPORT
PROCEDURE:  ANGIO HEAD W/WO

 

INDICATIONS:  L sided facial droop

 

CONTRAST:  IV CONTRAST: Optiray 320 ml: 80 PO CONTRAST: *NO PO CONTRAST 

 

TECHNIQUE:  

Precontrast 4.5 mm thick angled axial sections acquired from the foramen magnum to the vertex.   Afte
r the administration of intravenous contrast, 1 mm thick sections acquired through the [place] of [place]
is.  Postcontrast 4.5 mm thick sections then re-acquired from the foramen magnum to the vertex.  3-di
mensional maximum-intensity-projection (MIP) and/or volume rendering reformats were acquired of the c
entral intracranial vasculature.  For radiation dose reduction, the following was used:  automated ex
posure control, adjustment of mA and/or kV according to patient size.

 

COMPARISON:  CT head 10/8/2013

 

FINDINGS:  

Image quality:  Excellent.  

 

CSF spaces:  Basal cisterns are patent.  No extra-axial fluid collections.  Ventricles are normal in 
size and shape.  

 

Brain:  No intracranial hemorrhage, mass, or mass effect.  Gray-white matter interface appears preser
roxane. No abnormal cranial enhancement.

 

Skull and face:  Calvarium and facial bones appear intact, without suspicious lesions.  

 

Sinuses:  Visualized sinuses demonstrate mild mucosal thickening within the ethmoid sinuses. The mast
oid air cells are clear.

 

Anterior circulation:  Intracranial internal carotid arteries are patent bilaterally.  The paired ant
erior cerebral arteries are patent bilaterally.  The middle cerebral arteries is patent.  The anterio
r communicating artery is patent.  No high-grade stenosis, occlusion, or discrete filling defects. No
 cerebral aneurysm identified.

 

Posterior circulation:  Visualized portions of the vertebral arteries appear patent and join to form 
a patent basilar artery. The posterior cerebral arteries are patent bilaterally.  No high-grade steno
sis, occlusion, or discrete filling defects. No cerebral aneurysm identified.

 

IMPRESSION:  

 

1. No acute intracranial abnormality.

 

2. No high-grade stenosis or occlusion of the central intracranial arteries.

 

Reviewed by: Amaury Dutton MD on 12/5/2021 1:23 PM [facility]

Approved by: Amaury Dutton MD on 12/5/2021 1:23 PM [facility]

 

 

Station ID:  [id number]

## 2021-12-05 NOTE — ED PHYSICIAN DOCUMENTATION
History of Present Illness





- Stated complaint


Stated Complaint: COUGH/DIZZINESS





- Chief complaint


Chief Complaint: Resp





- Additonal information


Additional information: 


71-year-old male presents emergency department for evaluation of sinus pressure 

and congestion for about 2 weeks as well as a cough that is productive for about

3 weeks cough is mostly at night when supine.  However he is also concerned 

because he has had progressive dizziness for the last 6 months.  He often feels 

that he is drunk when walking.  He will list to the left.  He often has 

dizziness.  Sometimes as he walks he feels that the room is spinning and he has 

to stop to steady himself. He has begun to have some pins-and-needles sensation 

in his hands and feet.





Past medical history is most significant for thyroid disorder for which she is 

on levothyroxine.  No history of hypertension or diabetes.  Non-smoker.  Rare 

alcohol use.








Review of Systems


Constitutional: denies: Fever, Chills


Eyes: reports: Reviewed and negative


Ears: denies: Loss of hearing, Ear pain, Drainage/discharge, Foreign body


Nose: reports: Congestion


Throat: reports: Reviewed and negative


Cardiac: denies: Chest pain / pressure, Palpitations, Pedal edema


Respiratory: denies: Dyspnea, Cough, Hemoptysis, Wheezing


GI: reports: Abdominal Pain.  denies: Vomiting, Constipation, Diarrhea


: denies: Dysuria, Frequency, Hesitancy


Skin: denies: Rash, Lesions


Musculoskeletal: denies: Neck pain, Back pain, Extremity pain


Neurologic: reports: Other (Dizzy, off balance, pins-and-needles in hands and 

feet).  denies: Generalized weakness, Difficulty speaking, Near syncope, 

Syncope, Seizure, Confused, Headache, Head injury





PD PAST MEDICAL HISTORY





- Past Medical History


Cardiovascular: None


Respiratory: None


Neuro: None


Endocrine/Autoimmune: HyPOthyroidism


GI: None


: None


HEENT: Other


Psych: Depression


Musculoskeletal: Osteoarthritis, Chronic back pain


Derm: None





- Past Surgical History


Past Surgical History: Yes


General: Appendectomy


Ortho: ACL reconstruction





- Present Medications


Home Medications: 


                                Ambulatory Orders











 Medication  Instructions  Recorded  Confirmed


 


Levothyroxine Sodium [Synthroid] 175 mcg PO DAILY 10/08/13 07/13/21


 


Bupropion HCl [Wellbutrin Xl] 300 mg PO DAILY 12/20/15 07/13/21


 


Aspirin 81 mg PO DAILY 06/03/19 07/13/21


 


DULoxetine [Cymbalta] 20 mg PO DAILY 06/03/19 07/13/21


 


HYDROcod/ACETAM 5/325 [Norco 5/325] 1 ea PO Q6H PRN #10 tablet 07/13/21 


 


Benzonatate [Tessalon] 100 mg PO TID PRN #30 cap 12/05/21 














- Allergies


Allergies/Adverse Reactions: 


                                    Allergies











Allergy/AdvReac Type Severity Reaction Status Date / Time


 


No Known Drug Allergies Allergy   Verified 12/05/21 10:45














- Social History


Does the pt smoke?: No


Smoking Status: Never smoker


Does the pt drink ETOH?: Yes


Does the pt have substance abuse?: No





- Immunizations


Immunizations are current?: No


Immunizations: TDAP >10years/unknown





- POLST


Patient has POLST: No





PD ED PE EXPANDED





- General


General: Alert, No acute distress





- HEENT


HEENT: Atraumatic, PERRL, EOMI, Ears normal, Moist mucous membranes, Pharyngeal 

erythema





- Neck


Neck: Supple w/out meningeal sx.  No: Adenopathy





- Cardiac


Cardiac: Regular Rate, Radial strong equal, Cap refill < 2 sec.  No: Murmur 

Present





- Respiratory


Respiratory: Clear to ausultation radha.  No: Distress, Labored





- Abdomen


Abdomen: Normal Bowel sounds.  No: Tender to palpation





- Extremities


Extremities: Normal.  No: Deformity, Tenderness





- Neuro


Neuro: Alert and Oriented X 3, CNII-XII intact, Normal finger nose, Normal 

speech.  No: Nystagmus, Normal gait (Patient will begin to list to the left 

after walking about 50 feet.  Unable to perform heel toe)





- GCS


Eye Opening: Spontaneous


Motor: Obeys Commands


Verbal: Oriented


Total: 15





Results





- Vitals


Vitals: 


                               Vital Signs - 24 hr











  12/05/21





  10:43


 


Temperature 36.1 C L


 


Heart Rate 73


 


Respiratory 16





Rate 


 


Blood Pressure 146/92 H


 


O2 Saturation 99








                                     Oxygen











O2 Source                      Room air

















- Labs


Labs: 


                                Laboratory Tests











  12/05/21 12/05/21 12/05/21





  12:58 12:58 12:58


 


WBC  8.2  


 


RBC  4.93  


 


Hgb  14.9  


 


Hct  44.5  


 


MCV  90.3  


 


MCH  30.2  


 


MCHC  33.5  


 


RDW  13.8  


 


Plt Count  287  


 


MPV  9.2  


 


Neut # (Auto)  4.9  


 


Lymph # (Auto)  2.0  


 


Mono # (Auto)  1.0  


 


Eos # (Auto)  0.3  


 


Baso # (Auto)  0.0  


 


Absolute Nucleated RBC  0.00  


 


Nucleated RBC %  0.0  


 


Sodium   139 


 


Potassium   4.0 


 


Chloride   100 L 


 


Carbon Dioxide   29 


 


Anion Gap   10.0 


 


BUN   12 


 


Creatinine   1.0 


 


Estimated GFR (MDRD)   74 L 


 


Glucose   100 


 


Calcium   9.0 


 


Total Bilirubin   0.7 


 


AST   19 


 


ALT   15 


 


Alkaline Phosphatase   69 


 


Total Protein   7.1 


 


Albumin   4.0 


 


Globulin   3.1 


 


Albumin/Globulin Ratio   1.3 


 


Lipase   23 


 


Vitamin B12    340


 


TSH    < 0.08 L


 


Thyroxine (T4)   














  12/05/21





  12:58


 


WBC 


 


RBC 


 


Hgb 


 


Hct 


 


MCV 


 


MCH 


 


MCHC 


 


RDW 


 


Plt Count 


 


MPV 


 


Neut # (Auto) 


 


Lymph # (Auto) 


 


Mono # (Auto) 


 


Eos # (Auto) 


 


Baso # (Auto) 


 


Absolute Nucleated RBC 


 


Nucleated RBC % 


 


Sodium 


 


Potassium 


 


Chloride 


 


Carbon Dioxide 


 


Anion Gap 


 


BUN 


 


Creatinine 


 


Estimated GFR (MDRD) 


 


Glucose 


 


Calcium 


 


Total Bilirubin 


 


AST 


 


ALT 


 


Alkaline Phosphatase 


 


Total Protein 


 


Albumin 


 


Globulin 


 


Albumin/Globulin Ratio 


 


Lipase 


 


Vitamin B12 


 


TSH 


 


Thyroxine (T4)  9.63














- Rads (name of study)


  ** angio neck


Radiology: Final report received (No high-grade stenosis or occlusion of the 

head and neck arteries.)





  ** angio head


Radiology: Final report received (No acute intracranial abnormality.  No high-

grade stenosis or occlusion of the central intracranial arteries)





PD MEDICAL DECISION MAKING





- ED course


Complexity details: reviewed results, re-evaluated patient, considered 

differential, d/w patient


ED course: 





71-year-old male presents emergency department for evaluation of 2 concerns





The first 1 is that of dizziness feeling off balance and an altered gait for 

approximately 6 months.  He states that he often has a difficult time walking a 

straight line.  Though he does not drink sometimes he feels as though he is 

intoxicated.  No tinnitus or ringing of the ears no ear pain.  On presentation 

he has no obvious focal neuro deficits.  He had normal fluid speech normal 

finger-nose.  However he did seem to have a wide-based gait that veered to the 

left.  CT angio of the head neck showed no significant intracranial stenosis or 

lesions. 





Patient has also reported that he Has been developing some pins and needle 

sensations in his hands and feet.  He does have a known thyroid disorder.  

Though TSH is low his free T4 is normal.  No significant electrolyte 

abnormality.  No anemia.





Patient is advised to follow-up there is progressive dizziness and mild ataxia 

with his primary care provider.  Further evaluation with an MRI may be warranted

 and/or referral to a neurologist.





Patient second concern is that of nasal congestion for 3 weeks with a cough.  

Chest x-ray does not show acute focal opacities.  Patient would like to defer 

antibiotics if possible.  His cough is worse at night and I suspect is likely 

secondary to postnasal drip.  I will recommend some Sudafed during the day as 

well as Benadryl at night with Flonase.  Prescription for Tessalon Perles will 

be sent to the pharmacy.  Emergent return precautions otherwise discussed.





Departure





- Departure


Disposition: 01 Home, Self Care


Clinical Impression: 


 Ataxia, Dizziness, Congestion of nasal sinus, Cough





Condition: Stable


Record reviewed to determine appropriate education?: Yes


Follow-Up: 


Ike Sanderson DO [Primary Care Provider] - 


Prescriptions: 


Benzonatate [Tessalon] 100 mg PO TID PRN #30 cap


 PRN Reason: Cough


Comments: 


Patrick avalos were seen in the emergency department today for worsening dizziness 

over the last 6 months with changes in your walk.  This is called ataxia.





We did CT angiogram of your head and neck that did not show any stenosis or 

lesions.  Your B12 level was normal.  Your thyroid/free T4 was normal indicating

 your levothyroxine is dosed appropriately.





There are multiple other causes for the sensation of dizziness as well as a 

change in your gait.  Please discuss this with Dr. Sanderson.  You may benefit 

from further evaluation with an MRI of your head and neck and/or referral to a 

neurologist.





Your chest x-ray did not show signs of pneumonia.  I suspect that the cough is 

worse at night because of postnasal drip.  I recommend that you take 25 to 50 mg

 of Benadryl at night before sleep.  Using some Flonase nasal spray about an 

hour before bedtime can also help with congestion.





A dose or 2 of Sudafed over-the-counter can also help with the congestion.  I 

have sent a prescription for Tessalon Perles to the [facility] in [place].  This 

is a cough suppressant.





If at any point you develop fevers, have chest pain, shortness of air, develop 

any fainting episodes, slurred speech arm or leg weakness then please return 

immediately to the ER for a second evaluation.

## 2021-12-05 NOTE — XRAY REPORT
PROCEDURE:  Chest 2 View X-Ray

 

INDICATIONS:  cough

 

TECHNIQUE:  2 views of the chest.  

 

COMPARISON:  None.

 

FINDINGS:  

 

Surgical changes and devices:  None.  

 

Lungs and pleura:  No pleural effusions or pneumothorax.  Lungs are clear.  

 

Mediastinum:  Mediastinal contours are normal.  Heart size is normal.  

 

Bones and chest wall:  No suspicious bony abnormalities.  Soft tissues appear unremarkable.  

 

IMPRESSION:  

 

1. No acute cardiopulmonary disease.

 

Reviewed by: Amaury Dutton MD on 12/5/2021 10:28 AM [facility]

Approved by: Amaury Dutton MD on 12/5/2021 10:28 AM [facility]

 

 

Station ID:  [id number]

## 2021-12-07 ENCOUNTER — HOSPITAL ENCOUNTER (EMERGENCY)
Dept: HOSPITAL 76 - ED | Age: 71
Discharge: HOME | End: 2021-12-07
Payer: MEDICARE

## 2021-12-07 VITALS — DIASTOLIC BLOOD PRESSURE: 96 MMHG | SYSTOLIC BLOOD PRESSURE: 121 MMHG

## 2021-12-07 DIAGNOSIS — T78.40XA: Primary | ICD-10-CM

## 2021-12-07 DIAGNOSIS — R05.9: ICD-10-CM

## 2021-12-07 PROCEDURE — 99283 EMERGENCY DEPT VISIT LOW MDM: CPT

## 2021-12-07 PROCEDURE — 99282 EMERGENCY DEPT VISIT SF MDM: CPT

## 2021-12-07 NOTE — ED PHYSICIAN DOCUMENTATION
History of Present Illness





- Stated complaint


Stated Complaint: POST SHOT REACTION





- Chief complaint


Chief Complaint: General





- History obtained from


History obtained from: Patient





- History of Present Illness


Timing: Today


Pain level max: 0


Pain level now: 0





- Additonal information


Additional information: 





71-year-old male presents to the emergency department stating he had a allergy 

shot today, then developed hives.  He states no difficulty breathing, speaking. 

No stridor.  No wheezing.  Took Benadryl prior to arrival.  He also complains of

a chronic ongoing cough.  He states the Tessalon is not helping and would like 

something else for the cough.





Review of Systems


Constitutional: denies: Fever, Chills


Throat: denies: Sore throat


Cardiac: denies: Chest pain / pressure


Respiratory: reports: Cough (Dry).  denies: Dyspnea, Hemoptysis, Wheezing


GI: denies: Nausea, Vomiting, Diarrhea


Neurologic: denies: Headache





PD PAST MEDICAL HISTORY





- Past Medical History


Cardiovascular: None


Respiratory: None


Neuro: None


Endocrine/Autoimmune: HyPOthyroidism


GI: None


: None


HEENT: Other


Psych: Depression


Musculoskeletal: Osteoarthritis, Chronic back pain


Derm: None





- Past Surgical History


Past Surgical History: Yes


General: Appendectomy


Ortho: ACL reconstruction





- Present Medications


Home Medications: 


                                Ambulatory Orders











 Medication  Instructions  Recorded  Confirmed


 


Levothyroxine Sodium [Synthroid] 175 mcg PO DAILY 10/08/13 07/13/21


 


Bupropion HCl [Wellbutrin Xl] 300 mg PO DAILY 12/20/15 07/13/21


 


Aspirin 81 mg PO DAILY 06/03/19 07/13/21


 


DULoxetine [Cymbalta] 20 mg PO DAILY 06/03/19 07/13/21


 


HYDROcod/ACETAM 5/325 [Norco 5/325] 1 ea PO Q6H PRN #10 tablet 07/13/21 


 


Benzonatate [Tessalon] 100 mg PO TID PRN #30 cap 12/05/21 


 


Guaifenesin/Dextromethorphan 10 ml PO Q6H PRN #1 bottle 12/07/21 





[Robitussin Cough-Chest Dm Liq]   


 


predniSONE [Deltasone] 40 mg PO DAILY #10 tablet 12/07/21 














- Allergies


Allergies/Adverse Reactions: 


                                    Allergies











Allergy/AdvReac Type Severity Reaction Status Date / Time


 


No Known Drug Allergies Allergy   Verified 12/07/21 20:08














- Social History


Does the pt smoke?: No


Smoking Status: Never smoker


Does the pt drink ETOH?: Yes


Does the pt have substance abuse?: No





- Immunizations


Immunizations are current?: No


Immunizations: TDAP >10years/unknown





- POLST


Patient has POLST: No





PD ED PE NORMAL





- Vitals


Vital signs reviewed: Yes





- General


General: Alert and oriented X 3, No acute distress





- HEENT


HEENT: Moist mucous membranes, Pharynx benign





- Neck


Neck: Supple, no meningeal sign





- Cardiac


Cardiac: RRR, Strong equal pulses





- Respiratory


Respiratory: No respiratory distress, Clear bilaterally





- Abdomen


Abdomen: Soft, Non tender, Non distended





- Derm


Derm: Warm and dry, Other (mild diffuse urticaria)





- Neuro


Neuro: Alert and oriented X 3





- Psych


Psych: Normal mood, Normal affect





Results





- Vitals


Vitals: 


                               Vital Signs - 24 hr











  12/07/21 12/07/21





  20:04 21:06


 


Temperature 36.4 C L 36.5 C


 


Heart Rate 94 81


 


Respiratory 16 17





Rate  


 


Blood Pressure 147/102 H 121/96 H


 


O2 Saturation 97 99








                                     Oxygen











O2 Source                      Room air

















PD MEDICAL DECISION MAKING





- ED course


Complexity details: considered differential, d/w patient


ED course: 





71-year-old male with what appears to be a mild allergic reaction to his allergy

 shot today.  Will place on prednisone.  No evidence of anaphylaxis.  No airway 

involvement.  We will also prescribe cough medication for home for his chronic 

ongoing cough.  He will follow up with his doctor for further care.  Patient 

counseled regarding signs and symptoms for which I believe and urgent re-e

valuation would be necessary. Patient with good understanding of and agreement 

to plan and is comfortable going home at this time





This document was made in part using voice recognition software. While efforts 

are made to proofread this document, sound alike and grammatical errors may 

occur.





Departure





- Departure


Disposition: 01 Home, Self Care


Clinical Impression: 


 Cough





Allergic reaction


Qualifiers:


 Encounter type: initial encounter Qualified Code(s): T78.40XA - Allergy, 

unspecified, initial encounter





Condition: Good


Instructions:  ED Drug React Allergic


Follow-Up: 


Ike Sanderson DO [Primary Care Provider] - Within 1 week


Prescriptions: 


predniSONE [Deltasone] 40 mg PO DAILY #10 tablet


Guaifenesin/Dextromethorphan [Robitussin Cough-Chest Dm Liq] 10 ml PO Q6H PRN #1

bottle


 PRN Reason: Cough


Comments: 


Your prescriptions were sent to [facility] in [place].  Please follow-up with 

your doctor as needed for further care.  Return if you worsen.  You should 

contact your allergist about your reaction tonight.


Discharge Date/Time: 12/07/21 21:11

## 2022-01-04 ENCOUNTER — HOSPITAL ENCOUNTER (OUTPATIENT)
Dept: HOSPITAL 76 - DI | Age: 72
Discharge: HOME | End: 2022-01-04
Attending: FAMILY MEDICINE
Payer: MEDICARE

## 2022-01-04 DIAGNOSIS — R20.2: Primary | ICD-10-CM

## 2022-01-04 DIAGNOSIS — G11.9: ICD-10-CM

## 2022-01-04 PROCEDURE — 70553 MRI BRAIN STEM W/O & W/DYE: CPT

## 2022-01-04 NOTE — MRI REPORT
PROCEDURE:  Brain W/WO

 

INDICATIONS:  ATAXIA CEREBELLAR, HAND NUMBNESS

 

CONTRAST:  IV CONTRAST: Gadavist ml: 7.8  

 

TECHNIQUE:  

Noncontrast axial T1 spin echo, axial T2 fast spin echo, sagittal and axial FLAIR, coronal T2 fast sp
in echo, axial gradient echo, axial diffusion and ADC through the brain.  After the administration of
 contrast, axial and coronal T1 spin echo with fat saturation through the brain.  

 

COMPARISON:  Correlation is made with prior head and neck CT angiograms, 12/5/2021.

 

FINDINGS:  

Image quality:  Excellent.  

 

CSF spaces:  Basal cisterns are patent.  No extra-axial fluid collections.  Ventricles are normal in 
size and shape.  

 

Brain:  No midline shift.  No intracranial bleeds or masses.  No abnormal intracranial enhancement.  
There is mild cerebral volume loss.  There is periventricular white matter chronic small vessel ische
martine change.  The brainstem appears normal.  Diffusion-weighted images demonstrate no acute ischemic i
nsults.  No chronic ischemic insults.  Normal intravascular flow voids are present.  

 

Skull and face:  Calvarial marrow is normal in signal.  Orbits appear normal.  

 

Sinuses:  Sinuses and mastoids appear clear.  

 

 

 

IMPRESSION:  No findings of acute or subacute infarction are seen.

 

No masses or abnormal enhancement can be seen.  

 

Mild brain parenchymal volume loss and chronic small vessel ischemic change can be seen.

 

Reviewed by: Johnnie Stevenson MD on 1/4/2022 5:01 PM [facility]

Approved by: Johnnie Stevenson MD on 1/4/2022 5:01 PM [facility]

 

 

Station ID:  [id number]